# Patient Record
Sex: FEMALE | Race: WHITE | Employment: FULL TIME | URBAN - METROPOLITAN AREA
[De-identification: names, ages, dates, MRNs, and addresses within clinical notes are randomized per-mention and may not be internally consistent; named-entity substitution may affect disease eponyms.]

---

## 2017-02-06 ENCOUNTER — HOSPITAL ENCOUNTER (OUTPATIENT)
Dept: GENERAL RADIOLOGY | Age: 52
Discharge: HOME | End: 2017-02-06
Attending: INTERNAL MEDICINE
Payer: COMMERCIAL

## 2017-02-06 ENCOUNTER — OFFICE VISIT (OUTPATIENT)
Dept: INTERNAL MEDICINE CLINIC | Facility: CLINIC | Age: 52
End: 2017-02-06

## 2017-02-06 VITALS
DIASTOLIC BLOOD PRESSURE: 74 MMHG | SYSTOLIC BLOOD PRESSURE: 110 MMHG | RESPIRATION RATE: 16 BRPM | BODY MASS INDEX: 25.66 KG/M2 | WEIGHT: 154 LBS | TEMPERATURE: 97 F | HEIGHT: 65 IN | HEART RATE: 60 BPM

## 2017-02-06 DIAGNOSIS — M25.562 ACUTE PAIN OF LEFT KNEE: ICD-10-CM

## 2017-02-06 DIAGNOSIS — M25.562 ACUTE PAIN OF LEFT KNEE: Primary | ICD-10-CM

## 2017-02-06 PROCEDURE — 99213 OFFICE O/P EST LOW 20 MIN: CPT | Performed by: INTERNAL MEDICINE

## 2017-02-06 PROCEDURE — 73560 X-RAY EXAM OF KNEE 1 OR 2: CPT

## 2017-02-06 NOTE — PROGRESS NOTES
[facility]    CHIEF COMPLAINT:  Patient presents with:  Knee Pain: left knee pain. sx for 1 week. Sx come and go. Seems to improve but then gets \"pinching\" feeling with different positions. Heat increases sx.         HISTORY OF PRESENT ILLNESS: Cholesterol 73 >45 mg/dL   LDL Cholesterol 137 (H) <130 mg/dL   VLDL 10 5-40 mg/dL   Chol/HDL Ratio 3.01 <4.44   Non HDL Chol 147 (H) <130 mg/dL   -ASSAY, THYROID STIM HORMONE   Result Value Ref Range   TSH 1.490 0.350-5.500 mIU/mL   -COMP METABOLIC PANEL

## 2017-03-23 ENCOUNTER — OFFICE VISIT (OUTPATIENT)
Dept: INTERNAL MEDICINE CLINIC | Facility: CLINIC | Age: 52
End: 2017-03-23

## 2017-03-23 ENCOUNTER — PATIENT MESSAGE (OUTPATIENT)
Dept: INTERNAL MEDICINE CLINIC | Facility: CLINIC | Age: 52
End: 2017-03-23

## 2017-03-23 VITALS
WEIGHT: 146 LBS | SYSTOLIC BLOOD PRESSURE: 120 MMHG | HEART RATE: 60 BPM | TEMPERATURE: 98 F | RESPIRATION RATE: 16 BRPM | DIASTOLIC BLOOD PRESSURE: 70 MMHG | BODY MASS INDEX: 24 KG/M2

## 2017-03-23 DIAGNOSIS — K21.9 GASTROESOPHAGEAL REFLUX DISEASE, ESOPHAGITIS PRESENCE NOT SPECIFIED: ICD-10-CM

## 2017-03-23 DIAGNOSIS — R13.19 OTHER DYSPHAGIA: Primary | ICD-10-CM

## 2017-03-23 PROCEDURE — 99214 OFFICE O/P EST MOD 30 MIN: CPT | Performed by: PHYSICIAN ASSISTANT

## 2017-03-23 RX ORDER — RANITIDINE 150 MG/1
150 TABLET ORAL 2 TIMES DAILY
Qty: 60 TABLET | Refills: 0 | Status: SHIPPED | OUTPATIENT
Start: 2017-03-23 | End: 2017-04-22

## 2017-03-23 RX ORDER — CHLORAL HYDRATE 500 MG
1000 CAPSULE ORAL DAILY
COMMUNITY

## 2017-03-23 NOTE — PROGRESS NOTES
Lynn Cárdenas is a 54year old female  Patient presents with:  Throat Problem: pain in throat- sharp.  pain in upper chest.  some difficulty swallowing       HPI:   Pt states that the last few months she intermittently has pain in the base of her thr History:    Smoking Status: Never Smoker                      Alcohol Use: No               There is no problem list on file for this patient. REVIEW OF SYSTEMS:   GENERAL HEALTH: denies fatigue, fever, change in appetite.    SKIN: denies any unusual indicates understanding of these issues and agrees to the plan.

## 2017-03-23 NOTE — TELEPHONE ENCOUNTER
From: Richar Saucedo  To: Derek Ulloa MD  Sent: 3/23/2017 10:48 AM CDT  Subject: Non-Urgent Medical Question    I have had this occasional sharp pain deep in throat, kind of thyroid area when swallowing. More often in last few days.  Maybe from acid

## 2017-03-27 ENCOUNTER — HOSPITAL ENCOUNTER (OUTPATIENT)
Dept: ULTRASOUND IMAGING | Age: 52
Discharge: HOME | End: 2017-03-27
Attending: PHYSICIAN ASSISTANT
Payer: COMMERCIAL

## 2017-03-27 DIAGNOSIS — R13.19 OTHER DYSPHAGIA: ICD-10-CM

## 2017-03-27 PROCEDURE — 76536 US EXAM OF HEAD AND NECK: CPT

## 2017-03-29 ENCOUNTER — NURSE ONLY (OUTPATIENT)
Dept: LAB | Age: 52
End: 2017-03-29
Attending: PHYSICIAN ASSISTANT
Payer: COMMERCIAL

## 2017-03-29 DIAGNOSIS — E01.0 THYROMEGALY: ICD-10-CM

## 2017-03-29 PROCEDURE — 36415 COLL VENOUS BLD VENIPUNCTURE: CPT

## 2017-03-29 PROCEDURE — 84443 ASSAY THYROID STIM HORMONE: CPT

## 2017-03-29 PROCEDURE — 84479 ASSAY OF THYROID (T3 OR T4): CPT

## 2017-03-29 PROCEDURE — 86800 THYROGLOBULIN ANTIBODY: CPT

## 2017-04-03 ENCOUNTER — OFFICE VISIT (OUTPATIENT)
Dept: INTERNAL MEDICINE CLINIC | Facility: CLINIC | Age: 52
End: 2017-04-03

## 2017-04-03 VITALS
OXYGEN SATURATION: 98 % | BODY MASS INDEX: 24.32 KG/M2 | WEIGHT: 146 LBS | RESPIRATION RATE: 14 BRPM | SYSTOLIC BLOOD PRESSURE: 122 MMHG | DIASTOLIC BLOOD PRESSURE: 74 MMHG | HEIGHT: 65 IN | HEART RATE: 72 BPM

## 2017-04-03 DIAGNOSIS — E01.0 THYROMEGALY: ICD-10-CM

## 2017-04-03 DIAGNOSIS — R13.10 DYSPHAGIA, UNSPECIFIED TYPE: ICD-10-CM

## 2017-04-03 DIAGNOSIS — J02.9 SORE THROAT: ICD-10-CM

## 2017-04-03 DIAGNOSIS — R21 RASH: Primary | ICD-10-CM

## 2017-04-03 PROCEDURE — 99213 OFFICE O/P EST LOW 20 MIN: CPT | Performed by: PHYSICIAN ASSISTANT

## 2017-04-03 NOTE — PROGRESS NOTES
Román Gongora is a 54year old female  Patient presents with: Follow - Up: throat      HPI:   Pt here today for f/u  - was seen a few weeks ago regarding pain in throat and difficulty swallowing solids and liquids.    - she had felt like foods were g above    EXAM:   /74 mmHg  Pulse 72  Resp 14  Ht 65\"  Wt 146 lb  BMI 24.30 kg/m2  SpO2 98%  LMP 03/06/2017  GENERAL: well developed, well nourished,in no apparent distress  SKIN: macules noted all along face; non-tender  HEENT: atraumatic, normoceph

## 2017-05-12 PROBLEM — R09.89 GLOBUS SENSATION: Status: ACTIVE | Noted: 2017-05-12

## 2017-05-12 PROBLEM — R13.10 DYSPHAGIA, UNSPECIFIED TYPE: Status: ACTIVE | Noted: 2017-05-12

## 2017-05-12 PROBLEM — R19.8 GLOBUS SENSATION: Status: ACTIVE | Noted: 2017-05-12

## 2017-06-22 PROCEDURE — 88305 TISSUE EXAM BY PATHOLOGIST: CPT | Performed by: INTERNAL MEDICINE

## 2018-02-25 ENCOUNTER — HOSPITAL (OUTPATIENT)
Dept: OTHER | Age: 53
End: 2018-02-25
Attending: EMERGENCY MEDICINE

## 2018-06-05 PROCEDURE — 88305 TISSUE EXAM BY PATHOLOGIST: CPT | Performed by: INTERNAL MEDICINE

## 2019-11-08 PROBLEM — M65.9 TENOSYNOVITIS OF RIGHT FOOT: Status: ACTIVE | Noted: 2019-11-08

## 2019-11-08 PROBLEM — M19.071 PRIMARY OSTEOARTHRITIS OF RIGHT ANKLE: Status: ACTIVE | Noted: 2019-11-08

## 2020-07-20 PROBLEM — Z91.013 ALLERGY TO SHELLFISH: Status: ACTIVE | Noted: 2019-04-16

## 2020-07-20 PROBLEM — E72.12 METHYLENE THF REDUCTASE DEFICIENCY AND HOMOCYSTINURIA (HCC): Status: ACTIVE | Noted: 2019-04-16

## 2020-07-20 PROBLEM — N84.0 UTERINE POLYP: Status: ACTIVE | Noted: 2020-07-20

## 2020-07-20 PROBLEM — E72.11 METHYLENE THF REDUCTASE DEFICIENCY AND HOMOCYSTINURIA (HCC): Status: ACTIVE | Noted: 2019-04-16

## 2021-08-30 PROBLEM — M22.2X1 PATELLOFEMORAL PAIN SYNDROME OF RIGHT KNEE: Status: ACTIVE | Noted: 2021-08-30

## 2021-08-30 PROBLEM — M17.11 OSTEOARTHRITIS OF RIGHT KNEE, UNSPECIFIED OSTEOARTHRITIS TYPE: Status: ACTIVE | Noted: 2021-08-30

## 2022-10-02 ENCOUNTER — APPOINTMENT (OUTPATIENT)
Dept: CT IMAGING | Facility: HOSPITAL | Age: 57
End: 2022-10-02
Attending: EMERGENCY MEDICINE
Payer: COMMERCIAL

## 2022-10-02 ENCOUNTER — HOSPITAL ENCOUNTER (EMERGENCY)
Facility: HOSPITAL | Age: 57
Discharge: HOME | End: 2022-10-02
Attending: EMERGENCY MEDICINE
Payer: COMMERCIAL

## 2022-10-02 VITALS
OXYGEN SATURATION: 99 % | RESPIRATION RATE: 16 BRPM | HEART RATE: 65 BPM | SYSTOLIC BLOOD PRESSURE: 110 MMHG | WEIGHT: 171.94 LBS | DIASTOLIC BLOOD PRESSURE: 74 MMHG | TEMPERATURE: 98 F | BODY MASS INDEX: 30 KG/M2

## 2022-10-02 DIAGNOSIS — N23 RENAL COLIC: ICD-10-CM

## 2022-10-02 DIAGNOSIS — N20.0 KIDNEY STONE: Primary | ICD-10-CM

## 2022-10-02 LAB
ALBUMIN SERPL-MCNC: 3.5 G/DL (ref 3.4–5)
ALBUMIN/GLOB SERPL: 0.9 {RATIO} (ref 1–2)
ALP LIVER SERPL-CCNC: 65 U/L
ALT SERPL-CCNC: 19 U/L
ANION GAP SERPL CALC-SCNC: 7 MMOL/L (ref 0–18)
AST SERPL-CCNC: 14 U/L (ref 15–37)
BASOPHILS # BLD AUTO: 0.02 X10(3) UL (ref 0–0.2)
BASOPHILS NFR BLD AUTO: 0.3 %
BILIRUB SERPL-MCNC: 0.4 MG/DL (ref 0.1–2)
BILIRUB UR QL STRIP.AUTO: NEGATIVE
BUN BLD-MCNC: 13 MG/DL (ref 7–18)
CALCIUM BLD-MCNC: 9.1 MG/DL (ref 8.5–10.1)
CHLORIDE SERPL-SCNC: 110 MMOL/L (ref 98–112)
CO2 SERPL-SCNC: 23 MMOL/L (ref 21–32)
COLOR UR AUTO: YELLOW
CREAT BLD-MCNC: 0.82 MG/DL
EOSINOPHIL # BLD AUTO: 0.07 X10(3) UL (ref 0–0.7)
EOSINOPHIL NFR BLD AUTO: 1.2 %
ERYTHROCYTE [DISTWIDTH] IN BLOOD BY AUTOMATED COUNT: 12.4 %
GFR SERPLBLD BASED ON 1.73 SQ M-ARVRAT: 85 ML/MIN/1.73M2 (ref 60–?)
GLOBULIN PLAS-MCNC: 3.9 G/DL (ref 2.8–4.4)
GLUCOSE BLD-MCNC: 175 MG/DL (ref 70–99)
GLUCOSE UR STRIP.AUTO-MCNC: NEGATIVE MG/DL
HCT VFR BLD AUTO: 43.9 %
HGB BLD-MCNC: 14.2 G/DL
HYALINE CASTS #/AREA URNS AUTO: PRESENT /LPF
IMM GRANULOCYTES # BLD AUTO: 0.02 X10(3) UL (ref 0–1)
IMM GRANULOCYTES NFR BLD: 0.3 %
KETONES UR STRIP.AUTO-MCNC: NEGATIVE MG/DL
LIPASE SERPL-CCNC: 148 U/L (ref 73–393)
LYMPHOCYTES # BLD AUTO: 2.56 X10(3) UL (ref 1–4)
LYMPHOCYTES NFR BLD AUTO: 43.2 %
MCH RBC QN AUTO: 30.7 PG (ref 26–34)
MCHC RBC AUTO-ENTMCNC: 32.3 G/DL (ref 31–37)
MCV RBC AUTO: 94.8 FL
MONOCYTES # BLD AUTO: 0.44 X10(3) UL (ref 0.1–1)
MONOCYTES NFR BLD AUTO: 7.4 %
NEUTROPHILS # BLD AUTO: 2.81 X10 (3) UL (ref 1.5–7.7)
NEUTROPHILS # BLD AUTO: 2.81 X10(3) UL (ref 1.5–7.7)
NEUTROPHILS NFR BLD AUTO: 47.6 %
NITRITE UR QL STRIP.AUTO: NEGATIVE
OSMOLALITY SERPL CALC.SUM OF ELEC: 294 MOSM/KG (ref 275–295)
PH UR STRIP.AUTO: 5 [PH] (ref 5–8)
PLATELET # BLD AUTO: 203 10(3)UL (ref 150–450)
POTASSIUM SERPL-SCNC: 4 MMOL/L (ref 3.5–5.1)
PROT SERPL-MCNC: 7.4 G/DL (ref 6.4–8.2)
PROT UR STRIP.AUTO-MCNC: NEGATIVE MG/DL
RBC # BLD AUTO: 4.63 X10(6)UL
RBC UR QL AUTO: NEGATIVE
SODIUM SERPL-SCNC: 140 MMOL/L (ref 136–145)
SP GR UR STRIP.AUTO: 1.01 (ref 1–1.03)
UROBILINOGEN UR STRIP.AUTO-MCNC: <2 MG/DL
WBC # BLD AUTO: 5.9 X10(3) UL (ref 4–11)

## 2022-10-02 PROCEDURE — 83690 ASSAY OF LIPASE: CPT | Performed by: EMERGENCY MEDICINE

## 2022-10-02 PROCEDURE — 99284 EMERGENCY DEPT VISIT MOD MDM: CPT | Performed by: EMERGENCY MEDICINE

## 2022-10-02 PROCEDURE — 80053 COMPREHEN METABOLIC PANEL: CPT | Performed by: EMERGENCY MEDICINE

## 2022-10-02 PROCEDURE — 74176 CT ABD & PELVIS W/O CONTRAST: CPT | Performed by: EMERGENCY MEDICINE

## 2022-10-02 PROCEDURE — 81001 URINALYSIS AUTO W/SCOPE: CPT | Performed by: EMERGENCY MEDICINE

## 2022-10-02 PROCEDURE — 96361 HYDRATE IV INFUSION ADD-ON: CPT | Performed by: EMERGENCY MEDICINE

## 2022-10-02 PROCEDURE — 96374 THER/PROPH/DIAG INJ IV PUSH: CPT | Performed by: EMERGENCY MEDICINE

## 2022-10-02 PROCEDURE — 87086 URINE CULTURE/COLONY COUNT: CPT | Performed by: EMERGENCY MEDICINE

## 2022-10-02 PROCEDURE — 85025 COMPLETE CBC W/AUTO DIFF WBC: CPT | Performed by: EMERGENCY MEDICINE

## 2022-10-02 PROCEDURE — 96376 TX/PRO/DX INJ SAME DRUG ADON: CPT | Performed by: EMERGENCY MEDICINE

## 2022-10-02 PROCEDURE — 96375 TX/PRO/DX INJ NEW DRUG ADDON: CPT | Performed by: EMERGENCY MEDICINE

## 2022-10-02 RX ORDER — TAMSULOSIN HYDROCHLORIDE 0.4 MG/1
0.4 CAPSULE ORAL DAILY
Qty: 14 CAPSULE | Refills: 0 | Status: SHIPPED | OUTPATIENT
Start: 2022-10-02 | End: 2022-10-16

## 2022-10-02 RX ORDER — HYDROCODONE BITARTRATE AND ACETAMINOPHEN 5; 325 MG/1; MG/1
1-2 TABLET ORAL EVERY 6 HOURS PRN
Qty: 12 TABLET | Refills: 0 | Status: SHIPPED | OUTPATIENT
Start: 2022-10-02 | End: 2022-10-09

## 2022-10-02 RX ORDER — KETOROLAC TROMETHAMINE 15 MG/ML
15 INJECTION, SOLUTION INTRAMUSCULAR; INTRAVENOUS ONCE
Status: COMPLETED | OUTPATIENT
Start: 2022-10-02 | End: 2022-10-02

## 2022-10-02 RX ORDER — ONDANSETRON 2 MG/ML
4 INJECTION INTRAMUSCULAR; INTRAVENOUS ONCE
Status: COMPLETED | OUTPATIENT
Start: 2022-10-02 | End: 2022-10-02

## 2022-10-02 RX ORDER — HYDROMORPHONE HYDROCHLORIDE 1 MG/ML
0.5 INJECTION, SOLUTION INTRAMUSCULAR; INTRAVENOUS; SUBCUTANEOUS ONCE
Status: COMPLETED | OUTPATIENT
Start: 2022-10-02 | End: 2022-10-02

## 2022-10-02 RX ORDER — HYDROMORPHONE HYDROCHLORIDE 1 MG/ML
1 INJECTION, SOLUTION INTRAMUSCULAR; INTRAVENOUS; SUBCUTANEOUS ONCE
Status: COMPLETED | OUTPATIENT
Start: 2022-10-02 | End: 2022-10-02

## 2022-10-02 NOTE — ED INITIAL ASSESSMENT (HPI)
R lower back pain that radiates into lower R abdomen. Urinary frequency, pressure, pain, and nausea.

## (undated) NOTE — MR AVS SNAPSHOT
[address] [id number] [phone]               Thank you for choosing us for your health care visit with SUSANA Pickering.   We are glad to serve you and happy to provide you wi authorization, such as [place], please feel free to schedule your appointment immediately. However, if you are unsure about the requirements for authorization, please wait 5-7 days and then contact your physician's office.  At that time, you will Environmental allergies    Scallops     Shrimp                 Today's Vital Signs     BP Pulse Height Weight BMI    122/74 mmHg 72 65\" 146 lb 24.30 kg/m2         Current Medications          This list is accurate as of: 4/3/17 11:07 AM.  Always use your

## (undated) NOTE — MR AVS SNAPSHOT
[address]  [address] [address]  [phone]               Thank you for choosing us for your health care visit with SUSANA Cuello.   We are glad to serve you and happy to provide you wi Magnesium 500 MG Tabs   Take 1 tablet by mouth daily. omega-3 fatty acids 1000 MG Caps   Take 1,000 mg by mouth daily. Commonly known as:  FISH OIL           PROBIOTIC DAILY OR   Take 1 tablet by mouth daily.            RaNITidine HCl 150 MG Ta

## (undated) NOTE — MR AVS SNAPSHOT
[address] [address] [phone]               Thank you for choosing us for your health care visit with Susan Crowe MD.  We are glad to serve you and happy to provide you with Physical Therapy Referral - [facility] Location    Complete by:  As directed    Assoc Dx:  Acute pain of left knee [M25.562]           Ortho Referral - In Network    Complete by:  As directed    Assoc Dx:  Acute pain of left knee [M25.562]                 Ref PH: [phone]    Jagdeep Mccain 74        [facility]:  [address] [address] in [facility] in [address] [place] [address], [address] [place] [address], [facility] [[email or web address]] Street    [address]   [address] discharge instructions in [facility] by going to Visits < Admission Summaries. If you've been to the Emergency Department or your doctor's office, you can view your past visit information in [facility] by going to Visits < Visit Summaries. [employer] questions?